# Patient Record
Sex: FEMALE | Race: WHITE | NOT HISPANIC OR LATINO | Employment: STUDENT | URBAN - NONMETROPOLITAN AREA
[De-identification: names, ages, dates, MRNs, and addresses within clinical notes are randomized per-mention and may not be internally consistent; named-entity substitution may affect disease eponyms.]

---

## 2017-01-18 ENCOUNTER — OFFICE VISIT (OUTPATIENT)
Dept: OBSTETRICS AND GYNECOLOGY | Facility: CLINIC | Age: 42
End: 2017-01-18

## 2017-01-18 VITALS
WEIGHT: 133 LBS | HEIGHT: 64 IN | SYSTOLIC BLOOD PRESSURE: 124 MMHG | BODY MASS INDEX: 22.71 KG/M2 | DIASTOLIC BLOOD PRESSURE: 66 MMHG

## 2017-01-18 DIAGNOSIS — N39.3 SUI (STRESS URINARY INCONTINENCE, FEMALE): ICD-10-CM

## 2017-01-18 DIAGNOSIS — L73.9 FOLLICULITIS: ICD-10-CM

## 2017-01-18 DIAGNOSIS — N39.41 URGE INCONTINENCE OF URINE: Primary | ICD-10-CM

## 2017-01-18 LAB
GLUCOSE UR STRIP-MCNC: NEGATIVE MG/DL
LEUKOCYTE EST, POC: ABNORMAL
NITRITE UR-MCNC: NEGATIVE MG/ML
PROT UR STRIP-MCNC: NEGATIVE MG/DL
RBC # UR STRIP: ABNORMAL /UL

## 2017-01-18 PROCEDURE — 99213 OFFICE O/P EST LOW 20 MIN: CPT | Performed by: PHYSICIAN ASSISTANT

## 2017-01-18 PROCEDURE — 81002 URINALYSIS NONAUTO W/O SCOPE: CPT | Performed by: PHYSICIAN ASSISTANT

## 2017-01-18 RX ORDER — FLUCONAZOLE 150 MG/1
150 TABLET ORAL DAILY
Qty: 2 TABLET | Refills: 0 | Status: SHIPPED | OUTPATIENT
Start: 2017-01-18 | End: 2017-01-20

## 2017-01-18 RX ORDER — SULFAMETHOXAZOLE AND TRIMETHOPRIM 800; 160 MG/1; MG/1
1 TABLET ORAL 2 TIMES DAILY
Qty: 14 TABLET | Refills: 0 | Status: SHIPPED | OUTPATIENT
Start: 2017-01-18 | End: 2017-01-25

## 2017-01-18 NOTE — MR AVS SNAPSHOT
Edda Yin   1/18/2017 10:00 AM   Office Visit    Dept Phone:  [phone]   Encounter #:  [id number]    Provider:  Mary Kate Quinn PA-C   Department:  [facility] OBSTETRICS AND GYNECOLOGY                Your Full Care Plan              Today's Medication Changes          These changes are accurate as of: 1/18/17 11:17 AM.  If you have any questions, ask your nurse or doctor.               New Medication(s)Ordered:     fluconazole 150 MG tablet   Commonly known as:  DIFLUCAN   Take 1 tablet by mouth Daily for 2 doses. One po now and repeat in 3 days   Started by:  Mary Kate Quinn PA-C       sulfamethoxazole-trimethoprim 800-160 MG per tablet   Commonly known as:  BACTRIM DS   Take 1 tablet by mouth 2 (Two) Times a Day for 7 days.   Started by:  Mary Kate Quinn PA-C            Where to Get Your Medications      These medications were sent to [address] [phone] [place] [id number] [address]     Phone:  [phone]     fluconazole 150 MG tablet    sulfamethoxazole-trimethoprim 800-160 MG per tablet                  Your Updated Medication List          This list is accurate as of: 1/18/17 11:17 AM.  Always use your most recent med list.                fluconazole 150 MG tablet   Commonly known as:  DIFLUCAN   Take 1 tablet by mouth Daily for 2 doses. One po now and repeat in 3 days       sulfamethoxazole-trimethoprim 800-160 MG per tablet   Commonly known as:  BACTRIM DS   Take 1 tablet by mouth 2 (Two) Times a Day for 7 days.               We Performed the Following     POC Urinalysis Dipstick       You Were Diagnosed With        Codes Comments    Urge incontinence of urine    -  Primary ICD-10-CM: N39.41  ICD-9-CM: 788.31     Folliculitis     ICD-10-CM: L73.9  ICD-9-CM: 704.8     VICKI (stress urinary incontinence, female)     ICD-10-CM:  "N39.3  ICD-9-CM: 625.6       Instructions     None    Patient Instructions History      Upcoming Appointments     Visit Type Date Time Department    GYN FOLLOW UP 2017 10:00 AM [facility]    ANNUAL 3/22/2017  3:30 PM [facility] Signup     [facility] [employer] allows you to send messages to your doctor, view your test results, renew your prescriptions, schedule appointments, and more. To sign up, go to [employer] and click on the Sign Up Now link in the New User? box. Enter your [employer] Activation Code exactly as it appears below along with the last four digits of your Social Security Number and your Date of Birth () to complete the sign-up process. If you do not sign up before the expiration date, you must request a new code.    Tell[employer] Activation Code: [email or web address]  Expires: 2017 11:17 AM    If you have questions, you can email [email or web address] or call [phone] to talk to our [employer] staff. Remember, [employer] is NOT to be used for urgent needs. For medical emergencies, dial 911.               Other Info from Your Visit           Your Appointments     Mar 22, 2017  3:30 PM EDT   Annual with Mary Kate Quinn PA-C   [facility]ECOLOGY (--)    [address] [address] [phone]              Allergies     Keflex [Cephalexin]        Reason for Visit     Urine Leakage symptoms for two weeks.       Vital Signs     Blood Pressure Height Weight Breastfeeding? Body Mass Index Smoking Status    124/66 64\" (162.6 cm) 133 lb (60.3 kg) No 22.83 kg/m2 Current Every Day Smoker      Problems and Diagnoses Noted     Urge incontinence of urine    -  Primary    Folliculitis        VICKI (stress urinary incontinence, female)          Results     POC Urinalysis Dipstick      Component Value Standard Range & Units    Glucose, UA Negative Negative, 1000 mg/dL (3+) mg/dL    Blood, UA 2+ Negative    " Protein, POC Negative Negative mg/dL    Leukocytes [address]/ul Negative    Nitrite, UA Negative Negative

## 2017-01-18 NOTE — PROGRESS NOTES
"Subjective   Chief Complaint   Patient presents with   • Urine Leakage     symptoms for two weeks.      Visit Vitals   • /66   • Ht 64\" (162.6 cm)   • Wt 133 lb (60.3 kg)   • Breastfeeding No   • BMI 22.83 kg/m2      Edda Yin is a 41 y.o. year old  presenting to be seen for stress urine incontinence which has been quite frequent the past 2 weeks.   Patient has noted VICKI occasionally in the past but she has had congestion/cough the past 2 weeks and states incontinence has become much more prominent since coughing bouts--she is concerned she may have developed prolapse  Experiences occasional urgency and frequecny of urination also  She had hysterectomy -has ovaries  Also has complaint of a \"red spot\" on mons pubis noted for the past 3-4 days which is tender--she had similar occurrence about a year ago which resolved sponteneously  Has some pain from this spot on mons pubis that radiates to left groin  Past Medical History   Diagnosis Date   • Anemia    • Arthritis    • Bladder infection    • History of blood clots    • Kidney stone    • Lupus    • Osteopenia    • Osteoporosis         Current Outpatient Prescriptions:   •  fluconazole (DIFLUCAN) 150 MG tablet, Take 1 tablet by mouth Daily for 2 doses. One po now and repeat in 3 days, Disp: 2 tablet, Rfl: 0  •  sulfamethoxazole-trimethoprim (BACTRIM DS) 800-160 MG per tablet, Take 1 tablet by mouth 2 (Two) Times a Day for 7 days., Disp: 14 tablet, Rfl: 0   Allergies   Allergen Reactions   • Keflex [Cephalexin]       Past Surgical History   Procedure Laterality Date   • Hysteroscopy     • Cholecystectomy     • Tonsillectomy     • Diagnostic laparoscopy       x's 10, with removal of endometriosis       Social History     Social History   • Marital status:      Spouse name: N/A   • Number of children: N/A   • Years of education: N/A     Occupational History   • Not on file.     Social History Main Topics   • Smoking status: Current Every Day " Smoker   • Smokeless tobacco: Never Used   • Alcohol use No   • Drug use: No   • Sexual activity: Yes     Birth control/ protection: Surgical     Other Topics Concern   • Not on file     Social History Narrative   • No narrative on file      Family History   Problem Relation Age of Onset   • Hyperlipidemia Father    • Hypertension Father    • Stroke Father    • Osteoporosis Mother    • Hyperlipidemia Mother    • Hypertension Mother    • Stroke Mother    • Hyperlipidemia Brother    • Hypertension Brother    • Stroke Brother    • Hypertension Paternal Grandfather    • Hypertension Paternal Grandmother    • Hypertension Maternal Grandmother    • Stroke Maternal Grandmother    • Diabetes Maternal Grandfather    • Hypertension Maternal Grandfather        The following portions of the patient's history were reviewed and updated as appropriate:problem list, current medications, allergies, past family history, past medical history, past social history and past surgical history.    Review of Systems   Constitutional: Positive for unexpected weight change.   Gastrointestinal: Negative.    Endocrine: Positive for cold intolerance and heat intolerance.   Genitourinary: Positive for dysuria, frequency, urgency and vaginal discharge.        Objective     Physical Exam   Constitutional: Vital signs are normal. She appears well-developed and well-nourished.   Abdominal: Soft. Normal appearance. She exhibits no distension and no mass. There is no tenderness. There is no rigidity, no guarding and no CVA tenderness.   Genitourinary: Vagina normal. There is no rash or tenderness on the right labia. There is rash and tenderness on the left labia. Right adnexum displays no mass and no tenderness. Left adnexum displays no mass and no tenderness.   Genitourinary Comments: Left lower mons pubis with small patch of 2-3 small pustules which looks like folliculitis(shaves)     No cystocle or rectocele demonstrated  Vaginal cuff is well  supported  Cervix and uterus absent   Skin: Skin is warm, dry and intact.   Psychiatric: She has a normal mood and affect. Her behavior is normal.     Edda was seen today for urine leakage.    Diagnoses and all orders for this visit:    Urge incontinence of urine  -     POC Urinalysis Dipstick    Folliculitis    VICKI (stress urinary incontinence, female)    Other orders  -     sulfamethoxazole-trimethoprim (BACTRIM DS) 800-160 MG per tablet; Take 1 tablet by mouth 2 (Two) Times a Day for 7 days.  -     fluconazole (DIFLUCAN) 150 MG tablet; Take 1 tablet by mouth Daily for 2 doses. One po now and repeat in 3 days             This note was electronically signed.    Mary Kate Quinn PA-C   January 18, 2017

## 2017-01-18 NOTE — PATIENT INSTRUCTIONS
Patient advised no signs of pelvic relaxation on exam and expect VICKI to improve one patients cough resolved  Do advise kegels daily proactively  Bactrim  DS  for suspected folliculitis mons pubis and patient requested diflucan just in case yeast symptoms develop

## 2017-12-04 ENCOUNTER — OFFICE VISIT (OUTPATIENT)
Dept: FAMILY MEDICINE CLINIC | Facility: CLINIC | Age: 42
End: 2017-12-04

## 2017-12-04 VITALS
SYSTOLIC BLOOD PRESSURE: 112 MMHG | BODY MASS INDEX: 20.16 KG/M2 | HEIGHT: 65 IN | OXYGEN SATURATION: 98 % | WEIGHT: 121 LBS | HEART RATE: 72 BPM | DIASTOLIC BLOOD PRESSURE: 70 MMHG

## 2017-12-04 DIAGNOSIS — J06.9 ACUTE URI: Primary | ICD-10-CM

## 2017-12-04 PROCEDURE — 99203 OFFICE O/P NEW LOW 30 MIN: CPT | Performed by: FAMILY MEDICINE

## 2017-12-04 RX ORDER — ALBUTEROL SULFATE 90 UG/1
2 AEROSOL, METERED RESPIRATORY (INHALATION) EVERY 4 HOURS PRN
COMMUNITY

## 2017-12-04 RX ORDER — METHYLPREDNISOLONE 4 MG/1
TABLET ORAL
Qty: 21 TABLET | Refills: 0 | Status: SHIPPED | OUTPATIENT
Start: 2017-12-04 | End: 2018-01-12

## 2017-12-04 NOTE — PROGRESS NOTES
"Subjective   Edda Yin is a 42 y.o. female.     History of Present Illness  Ms. Yin presents today to establish care. She c/o \"cold\" symptoms for approx 1 week including dry, cough, nasal congestion, post nasal drip and sore throat. She has used OTC cough/cold meds with mild improvement. Reports h/o recurrent URI this year. Reports h/o Lupus and generally requires occasional corticosteroids to control symptoms.    She reports problem with loose stool since having viral GE in Sept. She has had eval per GI in [place] including EGD and colonoscopy which were reportedly normal other than HH and internal hemorrhoids. She has not yet had f/u. She continues to have loose stools as well as n/v after eating.    The following portions of the patient's history were reviewed and updated as appropriate: allergies, current medications, past family history, past medical history, past social history, past surgical history and problem list.    Review of Systems   Constitutional: Positive for fatigue. Negative for appetite change, fever and unexpected weight change.   HENT: Positive for congestion, postnasal drip, rhinorrhea, sore throat and voice change. Negative for ear pain, hearing loss, nosebleeds, sneezing, tinnitus and trouble swallowing.    Eyes: Negative for pain, discharge, redness and visual disturbance.   Respiratory: Negative for cough, chest tightness, shortness of breath and wheezing.    Cardiovascular: Positive for leg swelling. Negative for chest pain and palpitations.   Gastrointestinal: Positive for diarrhea, nausea and vomiting. Negative for abdominal pain, blood in stool and constipation.   Endocrine: Negative for cold intolerance, heat intolerance, polydipsia and polyuria.   Genitourinary: Negative for dysuria, flank pain, frequency, hematuria and urgency.   Musculoskeletal: Positive for arthralgias and myalgias. Negative for back pain, joint swelling and neck pain.   Skin: Negative for wound. "   Neurological: Positive for headaches. Negative for dizziness, tremors, seizures, syncope, speech difficulty, weakness and numbness.   Hematological: Positive for adenopathy (neck). Bruises/bleeds easily.   Psychiatric/Behavioral: Negative for confusion, dysphoric mood, sleep disturbance and suicidal ideas. The patient is not nervous/anxious.        Objective    Vitals:    12/04/17 0934   BP: 112/70   Pulse: 72   SpO2: 98%     Body mass index is 20.14 kg/(m^2).  Last 2 weights    12/04/17 0934   Weight: 121 lb (54.9 kg)       Physical Exam   Constitutional: She is oriented to person, place, and time. She appears well-developed and well-nourished. She is cooperative. She appears ill (mildly). No distress.   HENT:   Head: Normocephalic and atraumatic.   Right Ear: Tympanic membrane, external ear and ear canal normal.   Left Ear: Tympanic membrane, external ear and ear canal normal.   Nose: Mucosal edema and rhinorrhea present.   Mouth/Throat: Mucous membranes are normal. No oral lesions. Posterior oropharyngeal erythema (marked cobblestoning) present. No tonsillar exudate.   Eyes: Conjunctivae, EOM and lids are normal.   Neck: Phonation normal. Neck supple. Normal carotid pulses present. No thyroid mass and no thyromegaly present.   Cardiovascular: Normal rate, regular rhythm, S1 normal, S2 normal and intact distal pulses.    Pulmonary/Chest: Effort normal and breath sounds normal.   Abdominal: Soft. Bowel sounds are normal. She exhibits no distension and no mass. There is no hepatosplenomegaly. There is no tenderness.   Musculoskeletal: Normal range of motion. She exhibits no edema, tenderness or deformity.       Vascular Status -  Her exam exhibits no right foot edema. Her exam exhibits no left foot edema.  Lymphadenopathy:     She has no cervical adenopathy.   Neurological: She is alert and oriented to person, place, and time. She has normal strength and normal reflexes. She displays no tremor. No cranial nerve  deficit or sensory deficit. Gait normal.   Skin: Skin is warm and dry. No ecchymosis noted.   Psychiatric: She has a normal mood and affect. Her behavior is normal. Cognition and memory are normal.   Nursing note and vitals reviewed.      Assessment/Plan   Edda was seen today for establish care.    Diagnoses and all orders for this visit:    Acute URI    Other orders  -     MethylPREDNISolone (MEDROL, GEORGE,) 4 MG tablet; Take as directed on package instructions.    Symptomatic tx of likely viral URI reviewed.  As needed corticosteroid for lupus flare.  Patient was encouraged to keep me informed of any acute changes, lack of improvement, or any new concerning symptoms.  Records requested from previous primary provider as well as any consulting physician, admitting hospitals, etc. Further recommendations pending review.  Patient was encouraged to keep me informed of any acute changes, lack of improvement, or any new concerning symptoms.  Pt is aware of reasons to seek emergent care.  Patient voiced understanding of all instructions and denied further questions.

## 2017-12-08 ENCOUNTER — TELEPHONE (OUTPATIENT)
Dept: FAMILY MEDICINE CLINIC | Facility: CLINIC | Age: 42
End: 2017-12-08

## 2017-12-08 RX ORDER — SULFAMETHOXAZOLE AND TRIMETHOPRIM 800; 160 MG/1; MG/1
1 TABLET ORAL 2 TIMES DAILY
Qty: 20 TABLET | Refills: 0 | Status: SHIPPED | OUTPATIENT
Start: 2017-12-08 | End: 2017-12-18

## 2017-12-08 NOTE — TELEPHONE ENCOUNTER
Patient was seen as a new patient tues by Dr. Pineda and she states she is now bowing out green drainage and is requesting an antibiotic. She stated Dr. Pineda said to call if it got worse and she would send one in.

## 2018-01-12 ENCOUNTER — OFFICE VISIT (OUTPATIENT)
Dept: FAMILY MEDICINE CLINIC | Facility: CLINIC | Age: 43
End: 2018-01-12

## 2018-01-12 VITALS
HEIGHT: 65 IN | SYSTOLIC BLOOD PRESSURE: 100 MMHG | HEART RATE: 80 BPM | WEIGHT: 118 LBS | TEMPERATURE: 98.9 F | OXYGEN SATURATION: 99 % | BODY MASS INDEX: 19.66 KG/M2 | DIASTOLIC BLOOD PRESSURE: 74 MMHG

## 2018-01-12 DIAGNOSIS — M25.552 BILATERAL HIP PAIN: ICD-10-CM

## 2018-01-12 DIAGNOSIS — M54.10 RADICULOPATHY OF LEG: ICD-10-CM

## 2018-01-12 DIAGNOSIS — M25.551 BILATERAL HIP PAIN: ICD-10-CM

## 2018-01-12 PROCEDURE — 99213 OFFICE O/P EST LOW 20 MIN: CPT | Performed by: NURSE PRACTITIONER

## 2018-01-12 RX ORDER — HYDROCODONE BITARTRATE AND ACETAMINOPHEN 5; 325 MG/1; MG/1
1 TABLET ORAL EVERY 8 HOURS PRN
Qty: 30 TABLET | Refills: 0 | Status: SHIPPED | OUTPATIENT
Start: 2018-01-12 | End: 2018-01-22

## 2018-01-12 NOTE — PROGRESS NOTES
Subjective   Edda Yin is a 42 y.o. female.     Hip Pain    The incident occurred 3 to 5 days ago. The incident occurred at home. There was no injury mechanism. The pain is present in the right hip and right thigh. The quality of the pain is described as aching and stabbing. The pain is at a severity of 10/10. The pain is moderate. The pain has been constant since onset. Associated symptoms include an inability to bear weight (not unable to bear weight but it hurts to) and a loss of motion. Pertinent negatives include no numbness. She reports no foreign bodies present. The symptoms are aggravated by movement, palpation and weight bearing. She has tried NSAIDs, heat and rest for the symptoms. The treatment provided no relief.       The following portions of the patient's history were reviewed and updated as appropriate: allergies, current medications, past family history, past medical history, past social history, past surgical history and problem list.    Review of Systems   Constitutional: Negative.    HENT: Negative.    Eyes: Negative.    Respiratory: Negative.    Cardiovascular: Negative.    Gastrointestinal: Negative.    Genitourinary: Negative.    Musculoskeletal: Positive for arthralgias, gait problem and myalgias. Negative for back pain, joint swelling, neck pain and neck stiffness.        Right hip pain   Skin: Negative.    Neurological: Negative for dizziness, syncope, weakness and numbness.   Hematological: Negative for adenopathy.   Psychiatric/Behavioral: Negative for confusion and suicidal ideas. The patient is not nervous/anxious.        Objective   Physical Exam   Constitutional: She is oriented to person, place, and time. She appears well-developed and well-nourished. No distress.   HENT:   Head: Normocephalic.   Right Ear: External ear normal.   Left Ear: External ear normal.   Nose: Nose normal.   Mouth/Throat: Oropharyngeal exudate present.   Eyes: Conjunctivae are normal.   Neck: Normal  range of motion. Neck supple. No tracheal deviation present. No thyromegaly present.   Cardiovascular: Normal rate, regular rhythm, normal heart sounds and intact distal pulses.    No murmur heard.  Pulmonary/Chest: Effort normal and breath sounds normal. No respiratory distress. She has no wheezes. She has no rales. She exhibits no tenderness.   Abdominal: Soft. Bowel sounds are normal. She exhibits no distension and no mass. There is no hepatosplenomegaly or splenomegaly. There is no tenderness. There is no rebound and no guarding. No hernia.   Musculoskeletal: Normal range of motion. She exhibits tenderness. She exhibits no edema.   Painful and decreased ROM in bilateral hips. Greater in right hip   Lymphadenopathy:     She has no cervical adenopathy.        Right cervical: No superficial cervical, no deep cervical and no posterior cervical adenopathy present.       Left cervical: No superficial cervical, no deep cervical and no posterior cervical adenopathy present.   Neurological: She is alert and oriented to person, place, and time. Coordination and gait normal.   Skin: Skin is warm and dry. No rash noted.   Psychiatric: She has a normal mood and affect. Her behavior is normal. Judgment and thought content normal.   Nursing note and vitals reviewed.      Assessment/Plan   Edda was seen today for hip pain.    Diagnoses and all orders for this visit:    Bilateral hip pain  -     XR hip 1 vw bilateral; Future  -     HYDROcodone-acetaminophen (NORCO) 5-325 MG per tablet; Take 1 tablet by mouth Every 8 (Eight) Hours As Needed for Mild Pain  or Moderate Pain  for up to 10 days.    Radiculopathy of leg, right  -     XR hip 1 vw bilateral; Future  -     HYDROcodone-acetaminophen (NORCO) 5-325 MG per tablet; Take 1 tablet by mouth Every 8 (Eight) Hours As Needed for Mild Pain  or Moderate Pain  for up to 10 days.       Xrays of bilateral hips ordered, for further evaluation of her pain. There is a concern for  tendinitis since patient has been on chronic steroids and recently was on steroids and Levaquin for Pneumonia. I will contact patient regarding test results and provide instructions regarding any necessary changes in plan of care.    Hydrocodone prescribed for pain control, until the origin of her pain can be determined.     Patient was encouraged to keep me informed of any acute changes, lack of improvement, or any new concerning symptoms. Patient voiced understanding of all instructions and denied further questions.    Patient to RTC pending Xray results.

## 2018-01-17 ENCOUNTER — TELEPHONE (OUTPATIENT)
Dept: FAMILY MEDICINE CLINIC | Facility: CLINIC | Age: 43
End: 2018-01-17

## 2018-01-17 NOTE — TELEPHONE ENCOUNTER
I have tried calling patient a couple of times and can not leave vm because mail box is not set up yet. Do you want me to mail letter

## 2018-01-17 NOTE — TELEPHONE ENCOUNTER
----- Message from NAYA Turcios sent at 1/15/2018  1:08 PM EST -----  Her Xray of her hips are normal. Does does she want to try to do some PT?

## 2018-01-22 DIAGNOSIS — M25.551 BILATERAL HIP PAIN: Primary | ICD-10-CM

## 2018-01-22 DIAGNOSIS — M25.552 BILATERAL HIP PAIN: Primary | ICD-10-CM

## 2018-03-12 PROBLEM — E53.8 VITAMIN B 12 DEFICIENCY: Status: ACTIVE | Noted: 2018-03-12

## 2018-03-12 PROBLEM — E55.9 VITAMIN D DEFICIENCY: Status: ACTIVE | Noted: 2018-03-12

## 2018-08-22 ENCOUNTER — OFFICE VISIT (OUTPATIENT)
Dept: FAMILY MEDICINE CLINIC | Facility: CLINIC | Age: 43
End: 2018-08-22

## 2018-08-22 VITALS
SYSTOLIC BLOOD PRESSURE: 110 MMHG | WEIGHT: 116 LBS | BODY MASS INDEX: 19.33 KG/M2 | HEIGHT: 65 IN | HEART RATE: 58 BPM | DIASTOLIC BLOOD PRESSURE: 66 MMHG | OXYGEN SATURATION: 100 %

## 2018-08-22 DIAGNOSIS — R63.4 ABNORMAL WEIGHT LOSS: Primary | ICD-10-CM

## 2018-08-22 DIAGNOSIS — R35.0 FREQUENT URINATION: ICD-10-CM

## 2018-08-22 DIAGNOSIS — E55.9 VITAMIN D DEFICIENCY: ICD-10-CM

## 2018-08-22 DIAGNOSIS — M35.9 CONNECTIVE TISSUE DISEASE (HCC): ICD-10-CM

## 2018-08-22 DIAGNOSIS — E53.8 VITAMIN B 12 DEFICIENCY: ICD-10-CM

## 2018-08-22 DIAGNOSIS — E61.1 IRON DEFICIENCY: ICD-10-CM

## 2018-08-22 DIAGNOSIS — E46 MALNUTRITION, UNSPECIFIED TYPE (HCC): ICD-10-CM

## 2018-08-22 DIAGNOSIS — Z86.59 HISTORY OF EATING DISORDER: ICD-10-CM

## 2018-08-22 DIAGNOSIS — R63.39 FOOD AVERSION: ICD-10-CM

## 2018-08-22 DIAGNOSIS — F41.1 GAD (GENERALIZED ANXIETY DISORDER): ICD-10-CM

## 2018-08-22 LAB
25(OH)D3+25(OH)D2 SERPL-MCNC: 33.2 NG/ML
ALBUMIN SERPL-MCNC: 3.6 G/DL (ref 3.5–5)
ALBUMIN/GLOB SERPL: 1.4 G/DL (ref 1–2)
ALP SERPL-CCNC: 46 U/L (ref 38–126)
ALT SERPL-CCNC: 18 U/L (ref 13–69)
AST SERPL-CCNC: 20 U/L (ref 15–46)
BASOPHILS # BLD AUTO: 0.07 10*3/MM3 (ref 0–0.2)
BASOPHILS NFR BLD AUTO: 1.1 % (ref 0–2.5)
BILIRUB BLD-MCNC: NEGATIVE MG/DL
BILIRUB SERPL-MCNC: 0.3 MG/DL (ref 0.2–1.3)
BUN SERPL-MCNC: 3 MG/DL (ref 7–20)
BUN/CREAT SERPL: 4.3 (ref 7.1–23.5)
CALCIUM SERPL-MCNC: 9.8 MG/DL (ref 8.4–10.2)
CHLORIDE SERPL-SCNC: 108 MMOL/L (ref 98–107)
CLARITY, POC: CLEAR
CO2 SERPL-SCNC: 28 MMOL/L (ref 26–30)
COLOR UR: YELLOW
CREAT SERPL-MCNC: 0.7 MG/DL (ref 0.6–1.3)
EOSINOPHIL # BLD AUTO: 0.71 10*3/MM3 (ref 0–0.7)
EOSINOPHIL NFR BLD AUTO: 11.3 % (ref 0–7)
ERYTHROCYTE [DISTWIDTH] IN BLOOD BY AUTOMATED COUNT: 12.3 % (ref 11.5–14.5)
ERYTHROCYTE [SEDIMENTATION RATE] IN BLOOD BY WESTERGREN METHOD: 1 MM/HR (ref 0–20)
GLOBULIN SER CALC-MCNC: 2.5 GM/DL
GLUCOSE SERPL-MCNC: 78 MG/DL (ref 74–98)
GLUCOSE UR STRIP-MCNC: NEGATIVE MG/DL
HBA1C MFR BLD: 5.4 %
HCT VFR BLD AUTO: 40.4 % (ref 37–47)
HGB BLD-MCNC: 12.9 G/DL (ref 12–16)
IMM GRANULOCYTES # BLD: 0.01 10*3/MM3 (ref 0–0.06)
IMM GRANULOCYTES NFR BLD: 0.2 % (ref 0–0.6)
IRON SERPL-MCNC: 78 MCG/DL (ref 37–181)
KETONES UR QL: NEGATIVE
LEUKOCYTE EST, POC: NEGATIVE
LYMPHOCYTES # BLD AUTO: 2.72 10*3/MM3 (ref 0.6–3.4)
LYMPHOCYTES NFR BLD AUTO: 43.5 % (ref 10–50)
MCH RBC QN AUTO: 30.1 PG (ref 27–31)
MCHC RBC AUTO-ENTMCNC: 31.9 G/DL (ref 30–37)
MCV RBC AUTO: 94.2 FL (ref 81–99)
MONOCYTES # BLD AUTO: 0.4 10*3/MM3 (ref 0–0.9)
MONOCYTES NFR BLD AUTO: 6.4 % (ref 0–12)
NEUTROPHILS # BLD AUTO: 2.35 10*3/MM3 (ref 2–6.9)
NEUTROPHILS NFR BLD AUTO: 37.5 % (ref 37–80)
NITRITE UR-MCNC: NEGATIVE MG/ML
NRBC BLD AUTO-RTO: 0 /100 WBC (ref 0–0)
PH UR: 8 [PH] (ref 5–8)
PLATELET # BLD AUTO: 312 10*3/MM3 (ref 130–400)
POTASSIUM SERPL-SCNC: 5 MMOL/L (ref 3.5–5.1)
PROT SERPL-MCNC: 6.1 G/DL (ref 6.3–8.2)
PROT UR STRIP-MCNC: NEGATIVE MG/DL
RBC # BLD AUTO: 4.29 10*6/MM3 (ref 4.2–5.4)
RBC # UR STRIP: NEGATIVE /UL
SODIUM SERPL-SCNC: 142 MMOL/L (ref 137–145)
SP GR UR: 1.01 (ref 1–1.03)
TSH SERPL DL<=0.005 MIU/L-ACNC: 0.99 MIU/ML (ref 0.47–4.68)
UROBILINOGEN UR QL: NORMAL
VIT B12 SERPL-MCNC: 344 PG/ML (ref 239–931)
WBC # BLD AUTO: 6.26 10*3/MM3 (ref 4.8–10.8)

## 2018-08-22 PROCEDURE — 99214 OFFICE O/P EST MOD 30 MIN: CPT | Performed by: FAMILY MEDICINE

## 2018-08-22 RX ORDER — HYDROXYZINE PAMOATE 25 MG/1
25 CAPSULE ORAL 3 TIMES DAILY PRN
Qty: 30 CAPSULE | Refills: 1 | Status: SHIPPED | OUTPATIENT
Start: 2018-08-22 | End: 2018-09-05 | Stop reason: SDUPTHER

## 2018-08-22 RX ORDER — BUSPIRONE HYDROCHLORIDE 7.5 MG/1
TABLET ORAL
Qty: 120 TABLET | Refills: 2 | Status: SHIPPED | OUTPATIENT
Start: 2018-08-22 | End: 2018-09-04

## 2018-08-22 NOTE — PROGRESS NOTES
"Subjective   Edda Yin is a 42 y.o. female.     She c/o worsening anxiety and weight loss.      Anxiety   Presents for initial visit. Onset was more than 5 years ago (worse over past few months). The problem has been gradually worsening. Symptoms include compulsions, decreased concentration, dizziness, excessive worry, feeling of choking, insomnia, irritability, malaise, muscle tension, nausea, nervous/anxious behavior, obsessions, palpitations, panic and shortness of breath. Patient reports no chest pain, confusion, depressed mood or suicidal ideas. Primary symptoms comment: loss of appetite, food aversion. Symptoms occur constantly. The severity of symptoms is interfering with daily activities and causing significant distress. The symptoms are aggravated by work stress. The quality of sleep is poor.     Risk factors: history of HARRISON as well as eating disorder. Her past medical history is significant for anxiety/panic attacks. There is no history of suicide attempts. Past treatments include SSRIs. The treatment provided moderate relief. Prior compliance problems include medication issues (made her feel \"too numb\").     She was worried about possible UTI as she has lower back pain, intermittent dysuria.  No fever, no hematuria, no vaginal discharge.    She has previously required replacement for vitamin D deficiency, vitamin B12 deficiency, iron deficiency.  Unknown status.  She has had poor nutritional intake for the last few weeks.  Has noticed significant weight loss or proximally 15 pounds.    She reports a history of lupus although details unclear.  She was previously evaluated by rheumatology.  Failed several medications but did well on Plaquenil.  Had discontinued apparently due to elevated liver enzymes.  No recent mouth ulcers, rash.    She was treated for pneumonia/flu in January of this year.  Denies cough.    The following portions of the patient's history were reviewed and updated as appropriate: " allergies, current medications, past family history, past medical history, past social history, past surgical history and problem list.    Review of Systems   Constitutional: Positive for appetite change, fatigue, irritability and unexpected weight change. Negative for fever.   HENT: Positive for congestion. Negative for mouth sores, nosebleeds, rhinorrhea, sore throat and trouble swallowing.    Eyes: Negative for pain, discharge and visual disturbance.   Respiratory: Positive for shortness of breath. Negative for cough and wheezing.    Cardiovascular: Positive for palpitations. Negative for chest pain and leg swelling.   Gastrointestinal: Positive for abdominal pain and nausea. Negative for blood in stool, diarrhea and vomiting.   Endocrine: Positive for cold intolerance.   Genitourinary: Positive for dysuria and frequency. Negative for hematuria.   Musculoskeletal: Positive for arthralgias and myalgias. Negative for gait problem.   Skin: Negative for rash and wound.        Itching   Neurological: Positive for dizziness, weakness and headaches. Negative for tremors, seizures and syncope.   Hematological: Negative for adenopathy. Bruises/bleeds easily.   Psychiatric/Behavioral: Positive for decreased concentration and sleep disturbance. Negative for confusion, dysphoric mood and suicidal ideas. The patient is nervous/anxious and has insomnia.        Objective    Vitals:    08/22/18 1059   BP: 110/66   Pulse: 58   SpO2: 100%     Body mass index is 19.3 kg/m².  1    08/22/18  1059   Weight: 52.6 kg (116 lb)       Physical Exam   Constitutional: She is oriented to person, place, and time. She appears well-developed and well-nourished. She is cooperative. She appears ill (mildly). She appears distressed (emotionally).   Appears fatigued   HENT:   Head: Normocephalic and atraumatic.   Right Ear: Tympanic membrane, external ear and ear canal normal.   Left Ear: Tympanic membrane, external ear and ear canal normal.   Nose:  Nose normal. No mucosal edema or rhinorrhea.   Mouth/Throat: Oropharynx is clear and moist and mucous membranes are normal. No oral lesions. No posterior oropharyngeal erythema.   Eyes: Conjunctivae and lids are normal.   Neck: Phonation normal. Neck supple. Normal carotid pulses present. No thyromegaly present.   Cardiovascular: Regular rhythm, S1 normal, S2 normal and intact distal pulses.  Bradycardia present.  Exam reveals no gallop.    No murmur heard.  Pulmonary/Chest: Effort normal and breath sounds normal.   Abdominal: Soft. Bowel sounds are normal. She exhibits no distension and no mass. There is no hepatosplenomegaly. There is tenderness (mild) in the right upper quadrant. There is no rigidity, no rebound, no guarding and no CVA tenderness.   Musculoskeletal: Normal range of motion. She exhibits no edema, tenderness or deformity.   Diffuse soft tissue TTP     Vascular Status -  Her right foot exhibits no edema. Her left foot exhibits no edema.  Lymphadenopathy:     She has no cervical adenopathy.        Right: No supraclavicular adenopathy present.        Left: No supraclavicular adenopathy present.   Neurological: She is alert and oriented to person, place, and time. She has normal strength and normal reflexes. She displays no tremor. No cranial nerve deficit or sensory deficit. Gait normal.   Skin: Skin is warm and dry. No ecchymosis and no rash noted. No cyanosis. There is pallor. Nails show no clubbing.   Psychiatric: Her speech is normal and behavior is normal. Judgment and thought content normal. Her mood appears anxious. Cognition and memory are normal. She expresses no suicidal ideation.   Nursing note and vitals reviewed.    Urinalysis normal.    Assessment/Plan   Edda was seen today for panic attack, anxiety, insomnia, fatigue, difficulty urinating, urinary frequency and urinary retention.    Diagnoses and all orders for this visit:    Abnormal weight loss  -     CBC & Differential  -     TSH  Rfx On Abnormal To Free T4  -     Comprehensive Metabolic Panel  -     Hemoglobin A1c  -     Sedimentation Rate  -     Ambulatory Referral to Behavioral Health    Frequent urination  -     POC Urinalysis Dipstick, Automated    HARRISON (generalized anxiety disorder)  -     TSH Rfx On Abnormal To Free T4  -     Comprehensive Metabolic Panel  -     Ambulatory Referral to Behavioral Health    Food aversion  -     Ambulatory Referral to Behavioral Health    Vitamin D deficiency  -     Vitamin D 25 Hydroxy    Vitamin B 12 deficiency  -     CBC & Differential  -     Vitamin B12    Iron deficiency  -     CBC & Differential  -     Iron    Connective tissue disease (CMS/HCC)  -     CBC & Differential  -     Comprehensive Metabolic Panel  -     Sedimentation Rate    Malnutrition, unspecified type (CMS/HCC)  -     CBC & Differential  -     Comprehensive Metabolic Panel  -     Iron  -     Vitamin B12  -     Vitamin D 25 Hydroxy    History of eating disorder    Other orders  -     busPIRone (BUSPAR) 7.5 MG tablet; 1 po bid x 1 week, then can increase to 2 po bid as needed and tolerated  -     hydrOXYzine (VISTARIL) 25 MG capsule; Take 1 capsule by mouth 3 (Three) Times a Day As Needed for Itching or Anxiety.    Generalized anxiety with recent exacerbation of symptoms leading to panic attacks, insomnia and recurrence of eating disorder symptoms (texture problems, food aversion).  I reviewed risk/benefits and side effects of various treatment options.  She was understanding and wished to proceed with BuSpar scheduled twice daily along with Vistaril as needed.  She denies depression, suicidal ideation.  She is amenable to being referred to behavioral health for further evaluation/treatment.    Assess status of vit/min deficiencies and replace as indicated.    Unknown status of lupus/connective tissue disease.  Labs as above.    Urinalysis normal today.    I will contact patient regarding test results and provide instructions regarding  any necessary changes in plan of care.  F/u in 1 month, sooner as needed/instructed.  Patient was encouraged to keep me informed of any acute changes, lack of improvement, or any new concerning symptoms.  Pt is aware of reasons to seek emergent care.  Patient voiced understanding of all instructions and denied further questions.        Please note that portions of this note may have been completed with a voice recognition program. Efforts were made to edit the dictations, but occasionally words are mistranscribed.

## 2018-08-28 ENCOUNTER — TELEPHONE (OUTPATIENT)
Dept: FAMILY MEDICINE CLINIC | Facility: CLINIC | Age: 43
End: 2018-08-28

## 2018-08-28 NOTE — TELEPHONE ENCOUNTER
I spoke with patient and she since she started the Buspar she had episodes of crying for no reason, which she wasn't doing before.  She said she has no emotions now and just feels worse than before she started, its as if it has made her depressed. She stated she was going to hold off taking the Buspar and vistaril until she hears back from you. I also discussed this with Vesta and she agreed that was a good idea and to forward the message on to Dr. Dr. Pineda.

## 2018-08-28 NOTE — TELEPHONE ENCOUNTER
Pt is requesting a call to discuss her Buspar.  She states that she feels it is making her worse - she is crying all the time and feels very anxious.  Sts that she feels like she should really tell someone about how it is making her feel.  Please advise.

## 2018-08-28 NOTE — TELEPHONE ENCOUNTER
Per discussion with Saumya, since Dr Pineda is out of office, I am forwarding to [facility] for discussion today with either Vesta or Dr Calhoun.

## 2018-08-29 NOTE — TELEPHONE ENCOUNTER
If she is comfortable waiting until she sees Behavioral Health next week I am okay with that. If she is not sure if she can wait that long, please let me know.

## 2018-08-29 NOTE — TELEPHONE ENCOUNTER
Patient stated she is ok with waiting til her apt. Next week and she feels better since she stopped the medication.  She will let us know if she has any trouble before her apt next week.

## 2018-09-04 ENCOUNTER — OFFICE VISIT (OUTPATIENT)
Dept: PSYCHIATRY | Facility: CLINIC | Age: 43
End: 2018-09-04

## 2018-09-04 VITALS — WEIGHT: 111 LBS | BODY MASS INDEX: 18.49 KG/M2 | HEIGHT: 65 IN

## 2018-09-04 DIAGNOSIS — F50.9 EATING DISORDER: ICD-10-CM

## 2018-09-04 DIAGNOSIS — F32.A DEPRESSION, UNSPECIFIED DEPRESSION TYPE: Primary | ICD-10-CM

## 2018-09-04 DIAGNOSIS — F41.1 GENERALIZED ANXIETY DISORDER: ICD-10-CM

## 2018-09-04 PROCEDURE — 90792 PSYCH DIAG EVAL W/MED SRVCS: CPT | Performed by: PSYCHIATRY & NEUROLOGY

## 2018-09-04 RX ORDER — FLUOXETINE 10 MG/1
5 TABLET, FILM COATED ORAL DAILY
Qty: 15 TABLET | Refills: 0 | Status: SHIPPED | OUTPATIENT
Start: 2018-09-04 | End: 2018-10-04
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
## 2018-09-05 RX ORDER — HYDROXYZINE PAMOATE 25 MG/1
25 CAPSULE ORAL 3 TIMES DAILY PRN
Qty: 30 CAPSULE | Refills: 1 | Status: SHIPPED | OUTPATIENT
Start: 2018-09-05

## 2021-03-23 ENCOUNTER — BULK ORDERING (OUTPATIENT)
Dept: CASE MANAGEMENT | Facility: OTHER | Age: 46
End: 2021-03-23

## 2021-03-23 DIAGNOSIS — Z23 IMMUNIZATION DUE: ICD-10-CM

## 2023-08-22 ENCOUNTER — OFFICE VISIT (OUTPATIENT)
Dept: OBSTETRICS AND GYNECOLOGY | Facility: CLINIC | Age: 48
End: 2023-08-22
Payer: COMMERCIAL

## 2023-08-22 VITALS — BODY MASS INDEX: 17.87 KG/M2 | DIASTOLIC BLOOD PRESSURE: 60 MMHG | WEIGHT: 107.4 LBS | SYSTOLIC BLOOD PRESSURE: 104 MMHG

## 2023-08-22 DIAGNOSIS — Z12.31 ENCOUNTER FOR SCREENING MAMMOGRAM FOR MALIGNANT NEOPLASM OF BREAST: Primary | ICD-10-CM

## 2023-08-22 DIAGNOSIS — N39.46 MIXED INCONTINENCE: ICD-10-CM

## 2023-08-22 DIAGNOSIS — Z01.419 ENCOUNTER FOR GYNECOLOGICAL EXAMINATION WITHOUT ABNORMAL FINDING: ICD-10-CM

## 2023-08-22 RX ORDER — EPINEPHRINE 0.3 MG/.3ML
INJECTION SUBCUTANEOUS
COMMUNITY
Start: 2023-06-27

## 2023-08-22 RX ORDER — VENLAFAXINE HYDROCHLORIDE 37.5 MG/1
37.5 CAPSULE, EXTENDED RELEASE ORAL DAILY
Qty: 30 CAPSULE | Refills: 11 | COMMUNITY
Start: 2023-08-14 | End: 2024-08-13

## 2023-08-22 RX ORDER — LEVETIRACETAM 750 MG/1
750 TABLET ORAL
COMMUNITY
Start: 2023-08-14 | End: 2024-08-13

## 2023-08-22 RX ORDER — ACETAMINOPHEN 325 MG/1
TABLET ORAL
COMMUNITY
Start: 2023-05-11

## 2023-08-22 NOTE — PROGRESS NOTES
Subjective   Chief Complaint   Patient presents with    Gynecologic Exam     New Patient here for yearly exam and pap smear. Complains of Bladder prolapse     Edda Yin is a 47 y.o. year old .  No LMP recorded. Patient has had a hysterectomy.  She presents to be seen because of annual exam.   Family h/o Breast Cancer--never did HRT after complete Hysterectomy 20 years ago  OTHER COMPLAINTS:  Bladder drop?-- felt on the left vaginally a bulge-- no pain during intercourse  GSUI and Urge incontinence- no good response to bladder meds though this was many years ago.     The following portions of the patient's history were reviewed and updated as appropriate:She  has a past medical history of Abnormal Pap smear of cervix, Anemia, Arthritis, Asthma, CAP (community acquired pneumonia), Cervical dysplasia, COPD (chronic obstructive pulmonary disease), Endometriosis, Fibroid, Fibromyalgia, Gall stone, Hiatal hernia (), History of blood clots, History of echocardiogram (), History of stress test (), Hyperlipidemia, Kidney stone, Lupus, Migraine, Osteoarthritis, Osteopenia, Osteoporosis, Ovarian cyst, Urinary tract infection, and Varicella.  She does not have any pertinent problems on file.  She  has a past surgical history that includes Hysteroscopy; Cholecystectomy; Tonsillectomy; Diagnostic laparoscopy; Hysterectomy (); Esophagoscopy / EGD (); and Colonoscopy ().  Her family history includes Breast cancer in her sister; Cervical cancer in her mother; Diabetes in her brother and maternal grandfather; Hyperlipidemia in her brother, father, and mother; Hypertension in her brother, father, maternal grandfather, maternal grandmother, mother, paternal grandfather, and paternal grandmother; Osteoporosis in her mother; Prostate cancer in her father; Pulmonary embolism in her mother; Stroke in her brother, father, maternal grandmother, and mother.  She  reports that she has been smoking  cigarettes. She has been smoking an average of .5 packs per day. She has never used smokeless tobacco. She reports that she does not drink alcohol and does not use drugs.  Current Outpatient Medications   Medication Sig Dispense Refill    acetaminophen (TYLENOL) 325 MG tablet take 1 tablet by mouth every 4 to 6 hours as needed      albuterol (PROVENTIL HFA;VENTOLIN HFA) 108 (90 Base) MCG/ACT inhaler Inhale 2 puffs Every 4 (Four) Hours As Needed for Wheezing.      EPINEPHrine (EPIPEN) 0.3 MG/0.3ML solution auto-injector injection Inject  into the appropriate muscle as directed by prescriber.      levETIRAcetam (KEPPRA) 750 MG tablet Take 1 tablet by mouth.      venlafaxine XR (EFFEXOR-XR) 37.5 MG 24 hr capsule Take 1 capsule by mouth Daily. 30 capsule 11    hydrOXYzine (VISTARIL) 25 MG capsule Take 1 capsule by mouth 3 (Three) Times a Day As Needed for Itching or Anxiety. (Patient not taking: Reported on 8/22/2023) 30 capsule 1     No current facility-administered medications for this visit.     Current Outpatient Medications on File Prior to Visit   Medication Sig    acetaminophen (TYLENOL) 325 MG tablet take 1 tablet by mouth every 4 to 6 hours as needed    albuterol (PROVENTIL HFA;VENTOLIN HFA) 108 (90 Base) MCG/ACT inhaler Inhale 2 puffs Every 4 (Four) Hours As Needed for Wheezing.    EPINEPHrine (EPIPEN) 0.3 MG/0.3ML solution auto-injector injection Inject  into the appropriate muscle as directed by prescriber.    levETIRAcetam (KEPPRA) 750 MG tablet Take 1 tablet by mouth.    venlafaxine XR (EFFEXOR-XR) 37.5 MG 24 hr capsule Take 1 capsule by mouth Daily.    hydrOXYzine (VISTARIL) 25 MG capsule Take 1 capsule by mouth 3 (Three) Times a Day As Needed for Itching or Anxiety. (Patient not taking: Reported on 8/22/2023)     No current facility-administered medications on file prior to visit.     She is allergic to keflex [cephalexin].    Social History    Tobacco Use      Smoking status: Every Day        Packs/day:  0.50        Types: Cigarettes      Smokeless tobacco: Never    Review of Systems  Consitutional POS: nothing reported    NEG: anorexia or night sweats   Gastointestinal POS: nothing reported    NEG: bloating, change in bowel habits, melena, or reflux symptoms   Genitourinary POS: nothing reported    NEG: dysuria or hematuria   Integument POS: nothing reported    NEG: moles that are changing in size, shape, color or rashes   Breast POS: nothing reported    NEG: persistent breast lump, skin dimpling, or nipple discharge         Respiratory: negative  Cardiovascular: negative          Objective   /60   Wt 48.7 kg (107 lb 6.4 oz)   BMI 17.87 kg/mý     General:  well developed; well nourished  no acute distress   Skin:  No suspicious lesions seen   Thyroid: normal to inspection and palpation   Lungs:  breathing is unlabored  clear to auscultation bilaterally   Heart:  regular rate and rhythm, S1, S2 normal, no murmur, click, rub or gallop   Breasts:  Examined in supine position  Symmetric without masses or skin dimpling  Nipples normal without inversion, lesions or discharge  There are no palpable axillary nodes   Abdomen: soft, non-tender; no masses  no umbilical or inguinal hernias are present  no hepato-splenomegaly   Pelvis: Clinical staff was present for exam  External genitalia:  normal appearance of the external genitalia including Bartholin's and [place]'s glands.  :  urethral meatus normal;  Vaginal:  atrophic mucosal changes are present;  Cervix:  absent.  Uterus:  absent.  Adnexa:  absent, bilateral.  Rectal:  digital rectal exam not performed; anus visually normal appearing.     Psychiatric: Alert and oriented x3, mood and affect appropriate  HEENT: Atraumatic, normocephalic, normal scleral icterus  Extremities: 2+ pulses bilaterally, no edema      Lab Review   No data reviewed    Imaging   No data reviewed        Assessment   PE WNL  Sister with breast cancer @ 38 yoa  Mixed incont     Plan   PAP  done  MMG  URo referall  Diet/exercise    No orders of the defined types were placed in this encounter.         This note was electronically signed.      August 22, 2023

## 2023-08-24 LAB — REF LAB TEST METHOD: NORMAL
